# Patient Record
(demographics unavailable — no encounter records)

---

## 2018-01-01 NOTE — PDOC
History of Present Illness





- General


Chief Complaint: Vomiting/Diarrhea


Stated Complaint: VOMITING


Time Seen by Provider: 12/27/18 22:04


History Source: Parent(s)





- History of Present Illness


Initial Comments: 





12/27/18 23:00


3-month-old female brought in by mom for 2 episodes of vomiting yesterday at 

home with decreased by mouth milk. Mom reports that the baby is drinking 1-2 

ounces every 3 hours. As per mom baby normally drinks 5 ounces per feed. 

Patient is having wet diapers. Patient was noted to have moist mucosa smiling 

and interactive. Mom reports no diarrhea and having normal bowel movements 

every 2-3 days.  As per mom patient's been having some nasal congestion, denies 

cough/fever/abdominal pain.


12/27/18 23:03








Past History





- Past History


Allergies/Adverse Reactions: 


Allergies





No Known Drug Allergies Allergy (Verified 12/27/18 21:49)


 








Immunization Status Up to Date: Yes





- Social History


Smoking Status: Never smoked





**Review of Systems





- Review of Systems


Able to Perform ROS?: Yes


Is the patient limited English proficient: No


Constitutional: No: Symptoms Reported, See HPI, Chills, Diaphoresis, Fever, 

Loss of Appetite, Malaise, Night Sweats, Weakness, Weight Stable, Unintentional 

Wgt. Loss, Unexplained wgt Loss, Other


ABD/GI: Yes: Constipated, Vomiting





*Physical Exam





- Vital Signs


 Last Vital Signs











Temp Pulse Resp BP Pulse Ox


 


 98.4 F   124   28      97 


 


 12/27/18 21:49  12/27/18 21:49  12/27/18 21:49     12/27/18 21:49














- Physical Exam


General Appearance: Yes: Appropriately Dressed


HEENT: positive: TMs Normal, Pharynx Normal, Nasal Congestion.  negative: EOMI, 

CAPRICE, Normal ENT Inspection, Normal Voice, Symmetrical, Pale Conjunctivae, 

Photophobia, Scleral Icterus (R), Scleral Icterus (L), Muffled/Hoarse voice, 

Pharyngeal Erythema, Tonsillar Exudate, Tonsillar Erythema, Rhinorrhea, Sinus 

Tenderness, Orbits, Hearing Decreased, Hearing Grossly Normal, TM Bulging, TM 

Dull, TM Erythema, Lesions, Marks, Excessive drooling, Thrush, Other


Respiratory/Chest: positive: Lungs Clear, Normal Breath Sounds


Cardiovascular: positive: Regular Rhythm, Regular Rate


Gastrointestinal/Abdominal: positive: Normal Bowel Sounds.  negative: Tender


Extremity: positive: Normal Capillary Refill, Normal Inspection, Normal Range 

of Motion


Integumentary: positive: Normal Color, Dry, Warm


Neurologic: positive: Fully Oriented, Alert, Normal Mood/Affect





Moderate Sedation





- Procedure Monitoring


Vital Signs: 


Procedure Monitoring Vital Signs











Temperature  98.4 F   12/27/18 21:49


 


Pulse Rate  124   12/27/18 21:49


 


Respiratory Rate  28   12/27/18 21:49


 


Blood Pressure      


 


O2 Sat by Pulse Oximetry (%)  97   12/27/18 21:49











Medical Decision Making





- Medical Decision Making





12/27/18 22:37


tolerating PO milk. rsv/ flu pending. 





will give tylenol. 


12/27/18 23:30


HAd bowel movement in the ED. patient tolerated milk no vomiting will d/ chome





*DC/Admit/Observation/Transfer


Diagnosis at time of Disposition: 


 Nasal congestion, Vomiting alone








- Discharge Dispostion


Disposition: HOME





- Referrals


Referrals: 


ON STAFF,NOT [Primary Care Provider] - 





- Patient Instructions


Printed Discharge Instructions:  DI for Vomiting -- Infant


Additional Instructions: 


please follow up with her pediatrician tomorrow. 





encourage her formula feeding. 





Additional Instructions:


* Please call your personal physician to report your Emergency Department visit 

and to report your progress, if any.


* If there is no improvement in symptoms in 2 days call your physician.


* Return to the Emergency Department for any worsening symptoms.








- Post Discharge Activity

## 2019-02-01 NOTE — PDOC
Rapid Medical Evaluation


Time Seen by Provider: 02/01/19 14:59


Medical Evaluation: 


 Allergies











Allergy/AdvReac Type Severity Reaction Status Date / Time


 


No Known Drug Allergies Allergy   Verified 12/27/18 21:49











02/01/19 15:00


Pt c/o: coughing , diarrhea, and subjective fever x 3 days, no change in 

appetite, decreased wet diaper, or difficulty breathing


pt on brief exam: lcta, VSS, diaper wet


pt ordered for: none





**Discharge Disposition





- Diagnosis


 Coughing








- Referrals





- Patient Instructions





- Post Discharge Activity

## 2019-02-01 NOTE — PDOC
History of Present Illness





- General


Chief Complaint: Cold Symptoms


Stated Complaint: COUGHING


Time Seen by Provider: 02/01/19 14:59


History Source: Parent(s) (Mother)


Exam Limitations: No Limitations





- History of Present Illness


Initial Comments: 





02/01/19 15:25


HISTORY OF PRESENT ILLNESS:  This is a 4-month-old girl is up-to-date with 

immunizations with a normal birth history of presents emergency department for 

evaluation of cough, sneezing and low-grade fevers for 3 days. Mother states 

the child stays with her grandmother who babysits other children are both been 

diagnosed with influenza. One of the 2 other children as an inpatient 

hospitalization. Child is eating and drinking normally and is still making wet 

diapers. The mother reports having one episode of loose brown stools.





No recent travel. Multiple influenza contacts. 


PAST MEDICAL HISTORY: Denies past medical history


SURGICAL HISTORY: Denies


ALLERGIES: No known drug allergies





REVIEW OF SYSTEMS


General/Constitutional: +fever. Denies weakness, weight change.


HEENT: No pulling at ears. 


Respiratory: Moist unproductive cough. Denies wheezing, or hemoptysis. (+)

sneezing.


Gastrointestinal: Denies nausea, vomiting or constipation. Denies rectal 

bleeding. (+) loose brown stools.


Genitourinary: Denies change in urination.


Musculoskeletal:  Denies neck or back pain.


Skin: Denies rash or easy bruising.


Neurologic: Denies change in behavior.














PHYSICAL EXAM


General Appearance: Well-appearing, appropriately dressed.  No apparent distress

, no intoxication.


HEENT: EOMI, PERRLA, normal voice, TMs retracted bilaterally.  No conjunctival 

pallor.  No photophobia, scleral icterus. Oropharynx mildly erythematous 

without lesions or exudate. Cobblestoning noted in the posterior. No nasal 

discharge present.


Neck: Supple.  Trachea midline. No tenderness, rigidity, carotid bruit, stridor

, or thyromegaly. Nontender anterior cervical lymphadenopathy present.


Respiratory/Chest: Lungs CTAB.  No shortness of breath, chest tenderness, 

respiratory distress, accessory muscle use. No crackles, rales, rhonchi, stridor

, wheezing, dullness. No nasal flaring or grunting.


Cardiovascular: RRR. S1, S2.  No JVD, murmur, bradycardia, tachycardia.


Gastrointestinal/Abdominal: Normal bowel sounds. Abdomen soft, non-distended.  

No tenderness or rebound tenderness. No organomegaly, pulsatile mass, guarding, 

hernia, hepatomegaly, splenomegaly.


Musculoskeletal/Extremities:  Normal inspection. FROM of all extremities, 

normal capillary refill.  Pelvis Stable.  No CVA tenderness. No tenderness to 

extremities, pedal edema, swelling, erythema or deformity.


Integumentary: Appropriate color, dry, warm.  No cyanosis, erythema, jaundice 

or rash


Neurologic: Age appropriate.





Past History





- Past Medical History


Allergies/Adverse Reactions: 


 Allergies











Allergy/AdvReac Type Severity Reaction Status Date / Time


 


No Known Drug Allergies Allergy   Verified 02/01/19 15:04











Home Medications: 


Ambulatory Orders





Oseltamivir Phosphate [Tamiflu Oral Suspension -] 15 mg PO BID #25 ml 02/01/19 








COPD: No





- Immunization History


Immunization Up to Date: Yes





- Suicide/Smoking/Psychosocial Hx


Smoking History: Never smoked


Information on smoking cessation initiated: No


Hx Alcohol Use: No


Drug/Substance Use Hx: No





*Physical Exam





- Vital Signs


 Last Vital Signs











Temp Pulse Resp BP Pulse Ox


 


 98.1 F   117   32      100 


 


 02/01/19 15:00  02/01/19 15:00  02/01/19 15:00     02/01/19 15:00














Moderate Sedation





- Procedure Monitoring


Vital Signs: 


Procedure Monitoring Vital Signs











Temperature  98.1 F   02/01/19 15:00


 


Pulse Rate  117   02/01/19 15:00


 


Respiratory Rate  32   02/01/19 15:00


 


Blood Pressure      


 


O2 Sat by Pulse Oximetry (%)  100   02/01/19 15:00











Medical Decision Making





- Medical Decision Making





02/01/19 15:33


A/P:


4-month-old child with fevers, moist cough and sneezing for 3 days





Child with multiple sick contacts as cousins have been diagnosed with influenza


No nasal flaring present.


No grunting noted


Lungs clear to auscultation bilaterally





Influenza testing


Reassess





*DC/Admit/Observation/Transfer


Diagnosis at time of Disposition: 


 RSV infection, Influenza A








- Discharge Dispostion


Disposition: HOME


Condition at time of disposition: Fair


Decision to Admit order: No





- Prescriptions


Prescriptions: 


Oseltamivir Phosphate [Tamiflu Oral Suspension -] 15 mg PO BID #25 ml





- Referrals





- Patient Instructions


Printed Discharge Instructions:  Respiratory Syncytial Virus, DI for Influenza -

- Child


Additional Instructions: 


Rest.


Saline drops for nasal congestion.


Steamy showers to face break up mucus


Avoid contact with others until fevers and cough resolved as this is very 

contagious


Lots of handwashing and good hygiene





Tylenol for fever and pain


Take all of Tamiflu as directed: 2.5ml every 12 hours for 5 days





Followup with private physician in one to 2 days as needed or if worsening


Return to emergency department for worsened symptoms, fevers, dehydration


Influenza takes between 5 and 7 days for resolution


Do not participate in any activity, work, or school until fevers and cough are 

gone for at least one day





Return to emergency department for evaluation immediately at the child begins 

to experience difficulty breathing by using all of her chest muscles or belly 

muscles to breathe.





- Post Discharge Activity A/P 28y PPD#1 s/p , stable  1. Pain: well controlled on OPM  2. GI: Regular diet  3. : voiding  4. DVT prophylaxis: ambulate  5. Dispo: PPD 2, unless otherwise specified